# Patient Record
Sex: MALE | Race: WHITE | Employment: OTHER | URBAN - NONMETROPOLITAN AREA
[De-identification: names, ages, dates, MRNs, and addresses within clinical notes are randomized per-mention and may not be internally consistent; named-entity substitution may affect disease eponyms.]

---

## 2018-10-18 ENCOUNTER — HOSPITAL ENCOUNTER (EMERGENCY)
Age: 58
Discharge: HOME | End: 2018-10-18
Payer: COMMERCIAL

## 2018-10-18 VITALS
OXYGEN SATURATION: 99 % | HEART RATE: 67 BPM | BODY MASS INDEX: 27.92 KG/M2 | DIASTOLIC BLOOD PRESSURE: 84 MMHG | SYSTOLIC BLOOD PRESSURE: 126 MMHG | TEMPERATURE: 98.2 F | WEIGHT: 195 LBS | RESPIRATION RATE: 15 BRPM | HEIGHT: 70 IN

## 2018-10-18 DIAGNOSIS — S60.551A SUPERFICIAL FOREIGN BODY OF RIGHT HAND, INITIAL ENCOUNTER: Primary | ICD-10-CM

## 2018-10-18 PROCEDURE — 6370000000 HC RX 637 (ALT 250 FOR IP)

## 2018-10-18 PROCEDURE — 99283 EMERGENCY DEPT VISIT LOW MDM: CPT

## 2018-10-18 PROCEDURE — 10120 INC&RMVL FB SUBQ TISS SMPL: CPT

## 2018-10-18 RX ORDER — LIDOCAINE HYDROCHLORIDE 10 MG/ML
5 INJECTION, SOLUTION INFILTRATION; PERINEURAL ONCE
Status: DISCONTINUED | OUTPATIENT
Start: 2018-10-18 | End: 2018-10-18 | Stop reason: HOSPADM

## 2018-10-18 RX ORDER — BACITRACIN, NEOMYCIN, POLYMYXIN B 400; 3.5; 5 [USP'U]/G; MG/G; [USP'U]/G
OINTMENT TOPICAL
Status: COMPLETED
Start: 2018-10-18 | End: 2018-10-18

## 2018-10-18 RX ORDER — CEPHALEXIN 500 MG/1
500 CAPSULE ORAL 2 TIMES DAILY
Qty: 10 CAPSULE | Refills: 0 | Status: SHIPPED | OUTPATIENT
Start: 2018-10-18 | End: 2018-10-23

## 2018-10-18 RX ADMIN — BACITRACIN ZINC, NEOMYCIN SULFATE, AND POLYMYXIN B SULFATE: 400; 3.5; 5 OINTMENT TOPICAL at 07:28

## 2018-10-18 ASSESSMENT — ENCOUNTER SYMPTOMS
RHINORRHEA: 0
SHORTNESS OF BREATH: 0
EYE REDNESS: 0
VOMITING: 0
DIARRHEA: 0
COUGH: 0
SORE THROAT: 0
ABDOMINAL PAIN: 0
NAUSEA: 0
BACK PAIN: 0
EYE DISCHARGE: 0
WHEEZING: 0

## 2018-10-18 ASSESSMENT — PAIN DESCRIPTION - DESCRIPTORS: DESCRIPTORS: ACHING

## 2018-10-18 ASSESSMENT — PAIN SCALES - GENERAL: PAINLEVEL_OUTOF10: 2

## 2018-10-18 ASSESSMENT — PAIN DESCRIPTION - PAIN TYPE: TYPE: ACUTE PAIN

## 2018-10-18 ASSESSMENT — PAIN DESCRIPTION - LOCATION: LOCATION: HAND

## 2018-10-18 ASSESSMENT — PAIN DESCRIPTION - ORIENTATION: ORIENTATION: RIGHT

## 2018-10-18 NOTE — ED PROVIDER NOTES
[address] [facility] [address] [address] EMERGENCY DEPT      CHIEF COMPLAINT       Chief Complaint   Patient presents with    Hand Pain     splinter in right hand       Nurses Notes reviewed and I agreeexcept as noted in the HPI. HISTORY OF PRESENT ILLNESS    Miriam Smith is a 59 y.o. male who presents for the evaluation of a foreign body in his right hand. Patient states he has a \"splinter\" in his right palm since yesterday evening that is too deep for either he or his wife to extricate. He states he's fairly certain he has had a tetanus shot in the past 5 years. He also reports he is right-handed. Patient denies any fevers, chills, nausea, vomiting or diarrhea. Patient denies any chest pain or shortness of breath. Patient denies any further complaints at initial encounter. Location/Symptom: foreign body right palm  Timing/Onset: yesterday evening  Context/Setting: \"splinter\"  Quality: foreign body \"black spot\"  Duration: n/a  Modifying Factors: applied hydrogen peroxide, tried to remove  Severity: minimal    REVIEW OF SYSTEMS     Review of Systems   Constitutional: Negative for appetite change, chills, fatigue and fever. HENT: Negative for congestion, ear pain, rhinorrhea and sore throat. Eyes: Negative for discharge, redness and visual disturbance. Respiratory: Negative for cough, shortness of breath and wheezing. Cardiovascular: Negative for chest pain, palpitations and leg swelling. Gastrointestinal: Negative for abdominal pain, diarrhea, nausea and vomiting. Genitourinary: Negative for decreased urine volume, difficulty urinating, dysuria and hematuria. Musculoskeletal: Negative for arthralgias, back pain, joint swelling and neck pain. Skin: Positive for wound (foreign body sensation right palm). Negative for pallor and rash. Allergic/Immunologic: Negative for environmental allergies. Neurological: Negative for dizziness, syncope, weakness, light-headedness, numbness and headaches.    Hematological: (1.778 m)      The patient was seen within the ED today for the evaluation of foreign body sensation in his right hand. The patient arrived in no acute distress and in stable condition. Within the department, I observed the patient's vital signs to be within acceptable range. On exam, I appreciated a small puncture ruslan with palpable foreign body. Within the department, the patient was treated with neosporin and foreign body removal. I observed the patient's condition to remain stable during the duration of his stay. I explained my proposed course of treatment to the patient, who was amenable to my decision, and I answered all questions that were asked. He was discharged home in stable condition with prescriptions for Keflex, and the patient will return to the ED if his symptoms become more severe in nature or otherwise change. I advised the patient to follow-up with his PCP in 2 days. I also discussed return to ED precautions with the patient who verbalized understanding. CRITICAL CARE:   None    CONSULTS:  None    PROCEDURES:  Foreign Body  Date/Time: 10/18/2018 6:43 AM  Performed by: Boni Brush  Authorized by: Boni Brush     Consent:     Consent obtained:  Verbal    Consent given by:  Patient    Risks discussed:  Pain, infection, incomplete removal and bleeding    Alternatives discussed:  Delayed treatment  Location:     Location:  Hand    Hand location:  R palm    Tendon involvement:  None  Pre-procedure details:     Imaging:  None    Neurovascular status: intact      Preparation: Patient was prepped and draped in usual sterile fashion    Anesthesia (see MAR for exact dosages): Anesthesia method:  Local infiltration    Local anesthetic:  Lidocaine 1% w/o epi  Procedure type:     Procedure complexity:  Simple  Procedure details:     Scalpel size:  11    Localization method:  Probed    Dissection of underlying tissues: no      Bloodless field: no      Removal mechanism:   Forceps    Foreign

## 2018-10-18 NOTE — ED TRIAGE NOTES
Pt presents to the ED through triage with complaints of a splinter in his right hand. States that he got this splinter last night and that it is too deep for him to get out. States right hand pain 2/10. Pt alert and oriented. VS and assessment as documented. Call light in reach.

## 2023-03-01 LAB
A/G RATIO: 1.7 (ref 1.5–2.5)
ABSOLUTE BASO #: 0 /CMM (ref 0–200)
ABSOLUTE EOS #: 300 /CMM (ref 0–500)
ABSOLUTE LYMPH #: 2300 /CMM (ref 1000–4800)
ABSOLUTE MONO #: 400 /CMM (ref 0–800)
ABSOLUTE NEUT #: 2500 /CMM (ref 1800–7700)
ALBUMIN SERPL-MCNC: 4.3 G/DL (ref 3.5–5)
ALP BLD-CCNC: 59 IU/L (ref 41–137)
ALT SERPL-CCNC: 22 IU/L (ref 10–40)
ANION GAP SERPL CALCULATED.3IONS-SCNC: 4 MMOL/L (ref 4–12)
APPEARANCE: CLEAR
AST SERPL-CCNC: 19 IU/L (ref 15–41)
BASOPHILS RELATIVE PERCENT: 0.2 % (ref 0–2)
BILIRUB SERPL-MCNC: 0.8 MG/DL (ref 0.2–1)
BILIRUBIN URINE: NEGATIVE
BUN BLDV-MCNC: 16 MG/DL (ref 7–20)
CALCIUM SERPL-MCNC: 9.3 MG/DL (ref 8.8–10.5)
CHLORIDE BLD-SCNC: 106 MEQ/L (ref 101–111)
CHOLESTEROL/HDL RELATIVE RISK: 2.9 (ref 4–5)
CHOLESTEROL: 149 MG/DL
CO2: 29 MEQ/L (ref 21–32)
COLOR: YELLOW
CREAT SERPL-MCNC: 1.1 MG/DL (ref 0.6–1.3)
CREATININE CLEARANCE: >60
DIRECT-LDL / HDL RISK: 1.6
EOSINOPHILS RELATIVE PERCENT: 5 % (ref 0–6)
ESTIMATED AVERAGE GLUCOSE: 108 MG/DL
GLUCOSE URINE: NEGATIVE
GLUCOSE: 94 MG/DL (ref 70–110)
HBA1C MFR BLD: 5.4 % (ref 4.4–6.4)
HCT VFR BLD CALC: 44.2 % (ref 40–49)
HDLC SERPL-MCNC: 51 MG/DL
HEMOGLOBIN: 15.4 GM/DL (ref 13.5–16.5)
KETONES, URINE: NEGATIVE
LDL CHOLESTEROL DIRECT: 85 MG/DL
LEUKOCYTES, UA: NEGATIVE
LYMPHOCYTES RELATIVE PERCENT: 41.4 % (ref 15–45)
MCH RBC QN AUTO: 31.9 PG (ref 27.5–33)
MCHC RBC AUTO-ENTMCNC: 34.9 GM/DL (ref 33–36)
MCV RBC AUTO: 91.4 CU MIC (ref 80–97)
MONOCYTES RELATIVE PERCENT: 7.4 % (ref 2–10)
NEUTROPHILS RELATIVE PERCENT: 46 % (ref 40–70)
NITRITE, URINE: NEGATIVE
NUCLEATED RBCS: 0.1 /100 WBC
PDW BLD-RTO: 13 % (ref 12–16)
PH, URINE: 7 (ref 5–8)
PLATELET # BLD: 298 TH/CMM (ref 150–400)
POTASSIUM SERPL-SCNC: 5 MEQ/L (ref 3.6–5)
PROSTATE SPECIFIC ANTIGEN: 0.65 NG/ML
PROTEIN, URINE: NEGATIVE
RBC # BLD: 4.83 MIL/CMM (ref 4.5–6)
RBC URINE: NORMAL /HPF
SODIUM BLD-SCNC: 139 MEQ/L (ref 135–145)
SPECIFIC GRAVITY UA: 1.01 (ref 1–1.03)
SQUAMOUS EPITHELIAL: NORMAL /HPF
TOTAL PROTEIN: 6.9 G/DL (ref 6.2–8)
TRIGL SERPL-MCNC: 111 MG/DL
TSH REFLEX: 4.62 MCIU/ML (ref 0.49–4.67)
URINALYSIS REFLEX: NO
URINE HGB: NEGATIVE
UROBILINOGEN, URINE: NORMAL (ref 0.2–1)
VLDLC SERPL CALC-MCNC: 22 MG/DL
WBC # BLD: 5.5 TH/CMM (ref 4.4–10.5)
WBC URINE: NORMAL /HPF